# Patient Record
Sex: FEMALE | Race: WHITE
[De-identification: names, ages, dates, MRNs, and addresses within clinical notes are randomized per-mention and may not be internally consistent; named-entity substitution may affect disease eponyms.]

---

## 2017-06-02 NOTE — DIAGNOSTIC IMAGING REPORT
MRI OF THE BRAIN WITHOUT AND WITH IV CONTRAST



CLINICAL HISTORY: Gait apraxia.    



COMPARISON STUDY:  MRI of the brain October 27, 2016. 



TECHNIQUE:  Utilizing a 1.5 Estefany magnet and dedicated coil, multiplanar,

multiecho imaging of the brain was performed pre and postcontrast

administration.  IV administration of 7 mL of Gadavist contrast was uneventful.



FINDINGS: There are no areas of restricted diffusion. No acute intracranial

hemorrhage, midline shift or mass effect is present. Ventricular system is

normal for age. Basilar cisterns are patent. There are no extra-axial

collections. Flow-voids for the major intracranial vessels are present. No

intracranial mass or pathologic enhancement is present. Multiple white matter T2

hyperintense foci are noted. Minimal progression is noted since prior MRI. The

findings suggest small vessel disease. There is mild atrophy. Calvarial signal

is maintained. Orbits and sinuses are unremarkable.



IMPRESSION:  



1. No acute intracranial findings.



2. No intracranial masses or pathologic enhancement.



3. Mild atrophy and moderate small vessel disease.







Electronically signed by:  Andrea Newman M.D.

6/2/2017 11:46 AM



Dictated Date/Time:  6/2/2017 11:42 AM

## 2017-06-06 NOTE — CODING QUERY MEDICAL NECESSITY
SUPPORTING DIAGNOSIS NEEDED



Dr. Garcia,



A supporting diagnosis is required for the test/procedure performed on this patient in 
order for us to be reimbursed by the patient's insurance. Please provide a supporting 
diagnosis for the following test/procedure listed below next to the test name along with 
your signature. 



*If there is no additional diagnosis for this patient that would support the following 
test/procedure please document that below next to the test/procedure.



Test(s)/Procedure(s) that require a supporting diagnosis:





* ([id number]) B12 VITAMIN LEVEL       DIAGNOSIS:





***DATE OF SERVICE: 5/31/17***





Provider Signature:  ______________________________  Date:  _______



Thank you  

Rk Eugene

[facility] Information Management

Phone:  [phone]

Fax:  [phone]



Once completed, please kindly fax back to [phone]



For questions please call [phone]

## 2017-11-14 NOTE — MAMMOGRAPHY REPORT
BILATERAL DIGITAL SCREENING MAMMOGRAM WITH CAD: 11/13/2017

CLINICAL HISTORY: Routine screening.  Patient has no complaints.  





TECHNIQUE: Bilateral CC and MLO views were obtained.  Current study was also evaluated with a Compute
r Aided Detection (CAD) system.  



COMPARISON: Comparison is made to exams dated:  10/20/2016 mammogram, 10/19/2015 mammogram, 11/4/2014
 mammogram, 10/17/2014 mammogram, 10/4/2013 mammogram, and 10/2/2012 mammogram - [facility].   



BREAST COMPOSITION:  There are scattered areas of fibroglandular density in both breasts.  



FINDINGS:  The exam is suboptimal due to inability of the patient to adequately position for the exam
, despite assistance from a second mammography technologist.  Within this limitation, there are moder
ate vascular calcifications in the breasts, and a stable biopsy marker clip in the left upper outer q
uadrant posteriorly.  A few scattered benign-appearing microcalcifications.  Decreased prominence of 
a nodular asymmetry in the right breast along the posterior nipple line on the CC view.  No new suspi
cious mass, architectural distortion or cluster of microcalcifications is seen.  



IMPRESSION:  ACR BI-RADS CATEGORY 2: BENIGN

There is no mammographic evidence of malignancy, within the limitations of the exam. A 1 year screeni
ng mammogram is recommended.  The patient will receive written notification of the results.  





Approximately 10% of breast cancers are not detected with mammography. A negative mammographic report
 should not delay biopsy if a clinically suggestive mass is present.



Rose Andersen M.D.          

ay/:11/13/2017 16:48:50  



Imaging Technologist: Vicky MCCRACKEN(HORACIO)(ISAIAH)(BD), [facility]

letter sent: Normal 1/2  

BI-RADS Code: ACR BI-RADS Category 2: Benign

## 2018-05-11 ENCOUNTER — HOSPITAL ENCOUNTER (EMERGENCY)
Dept: HOSPITAL 45 - C.EDB | Age: 83
Discharge: HOME | End: 2018-05-11
Payer: COMMERCIAL

## 2018-05-11 VITALS — OXYGEN SATURATION: 95 % | DIASTOLIC BLOOD PRESSURE: 95 MMHG | HEART RATE: 62 BPM | SYSTOLIC BLOOD PRESSURE: 142 MMHG

## 2018-05-11 VITALS — TEMPERATURE: 97.88 F

## 2018-05-11 DIAGNOSIS — I10: ICD-10-CM

## 2018-05-11 DIAGNOSIS — E11.9: ICD-10-CM
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
## 2018-05-11 DIAGNOSIS — M25.461: Primary | ICD-10-CM
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
## 2018-05-11 NOTE — EMERGENCY ROOM VISIT NOTE
ED Visit Note


First contact with patient:  14:58


I have seen and examined this patient with Eugene Ansari and generally agree 

with the treatment plan as discussed.


Problem List


Medical Problems:


(1) Benign hypertension


Status: Chronic  





(2) Cholecystectomy


Status: Resolved  





(3) Diabetes mellitus


Status: Chronic  





(4) Hemorrhoid


Status: Resolved  











Current/Historical Medications


Scheduled


Amlodipine Besylate (Norvasc), 5 MG PO DAILY


Aspirin (Aspirin Ec), 81 MG PO Q2D


Cetirizine (Zyrtec), 10 MG PO DAILY


Esomeprazole Magnesium (Nexium), 40 MG PO DAILY


Metformin Hcl (Glucophage), 500 MG PO BID


Metoprolol Succinate (Metoprolol Succinate ER), 25 MG PO BID


Mometasone Furoate (Nasal) (Nasonex), 1 SPRAY GONZÁLEZ DAILY


Multiple Vitamins W/ Minerals (Centrum Silver), 1 TAB PO DAILY


Omega-3 Fatty Acids (Omega 3), 1 CAP PO QAM


Psyllium (Metamucil Powder), 1 PACK PO DAILY


Simvastatin (Zocor), 20 MG PO QPM


Valsartan (Diovan), 80 MG PO DAILY





Allergies


Coded Allergies:  


     Amoxicillin (Unverified  Allergy, Unknown, diarrhea, 11/24/16)


     Ciprofloxacin (Verified  Adverse Reaction, Unknown, nausea/vomiting, 11/24/ 16)


     Hydrocodone (Verified  Adverse Reaction, Unknown, n/v loss of appetite, 11/ 24/16)





Vital Signs











  Date Time  Temp Pulse Resp B/P (MAP) Pulse Ox O2 Delivery O2 Flow Rate FiO2


 


5/11/18 14:55 36.6 72 18 151/67 97 Room Air  











Departure Information


Referrals


Garrett Garcia M.D. (PCP)





Patient Instructions


My [facility]

## 2018-05-11 NOTE — EMERGENCY ROOM VISIT NOTE
History


First contact with patient:  14:58


Chief Complaint:  LEG PAIN,LEG INJURY


Stated Complaint:  FT LEG PAIN





History of Present Illness


The patient is a 82 year old female who presents to the Emergency Room with her 

 with complaints of right knee and leg pain.  The patient reports that 

she was trying to get into a pickup when her right foot slipped, and she fell, 

twisting her knee.  She denies hitting her head, and denies any other injuries 

to her upper extremities, neck or back.  She rates her discomfort a 5 out of 

10.  The patient denies any prior history of right knee surgeries.





Review of Systems


10 system review was performed and was negative except for pertinent positives 

and negatives as indicated in history of present illness





Past Medical/Surgical History


Medical Problems:


(1) Benign hypertension


(2) Cholecystectomy


(3) Diabetes mellitus


(4) Hemorrhoid








Family History





Gallbladder disease


Heart disease


Hypertension





Social History


Smoking Status:  Never Smoker


Alcohol Use:  none


Drug Use:  none


Marital Status:  in relationship


Housing Status:  lives with significant other


Occupation Status:  retired





Current/Historical Medications


Scheduled


Artificial Saliva (Mouthkote), 1 SPRAY MT Before Meals


Atorvastatin (Lipitor), 10 MG PO HS


Cholecalciferol (D3), 400 UNITS PO DAILY


Cyanocobalamin (Vitamin B-12), 1,000 MCG PO DAILY


Fluticasone Propionate (Nasal) (Flonase Allergy Relief), 1 SPRAY GONZÁLEZ QD@1700


Metoprolol Succinate (Metoprolol Succinate ER), 25 MG PO BID


Multiple Vitamins W/ Minerals (Theragran-M), 1 TAB PO QD@1700


Pantoprazole Sodium (Protonix), 40 MG PO QD@1700


Valsartan (Diovan), 80 MG PO DAILY


Valsartan (Diovan), 40 MG PO QD@1800





Physical Exam


Vital Signs











  Date Time  Temp Pulse Resp B/P (MAP) Pulse Ox O2 Delivery O2 Flow Rate FiO2


 


5/11/18 14:55 36.6 72 18 151/67 97 Room Air  











Physical Exam


CONSTITUTIONAL:  Healthy and well nourished.  Alert and oriented X 3 with 

positive affect.  Patient appears in mild discomfort.


HEENT:  Normocephalic, atraumatic.  Pupils equal, round and reactive.  No 

epistaxis, hemotympanum, subconjunctival hemorrhage, raccoon's eyes or kraus 

sign.


NECK:  Full active range of motion without discomfort.


RESPIRATORY:  Clear to auscultation bilaterally with no wheezing, crackles, 

rhonchi or stridor.


CARDIOVASCULAR:  Regular rate and rhythm with no murmurs, rubs or gallops.


GASTROINTESTINAL:  Bowel sounds present in all quadrants.  Soft and nontender 

to palpation.


MUSCULOSKELETAL: Examination shows generalized discomfort to palpation of the 

right knee and proximal lateral leg.  Patient has no focal tenderness over the 

medial joint line or peripatellar region.  Range of motion was deferred on 

initial exam because of patient discomfort.  Pedal pulses are intact.  The 

patient has no tenderness to palpation through the distal leg, ankle or foot 

region.


INTEGUMENTARY:  No rash or other significant dermatologic conditions noted.


NEUROLOGIC:  No focal neurologic deficits noted.  Right foot and toes are 

grossly sensory intact.





Medical Decision & Procedures


ER Provider


Diagnostic Interpretation:


My interpretation of right knee and tib-fib x-rays shows a moderate knee joint 

effusion, otherwise no other acute fractures or dislocation noted.  Radiologist 

report is as follows:





R TIBIA/FIBULA 2 VIEWS ROUTINE, R KNEE 3 VIEWS





HISTORY:  82 years-old Female R knee/leg pain acute right knee, tibia and fibula


pain





COMPARISON: None available





TECHNIQUE: 3 views of the right knee with 2 views of the right tibia and fibula





FINDINGS: 





KNEE:


The bones appear mildly demineralized. Tricompartmental osteoarthritis, moderate


within the medial compartment, mild to moderate patellofemoral and mild lateral


compartment disease. Small-to-moderate knee joint effusion with mild soft tissue


swelling about the knee. No acute fracture or dislocation is identified.


Peripheral arterial calcifications are noted.





TIBIA/FIBULA:


No acute fracture or dislocation. Degenerative changes about the ankle. Moderate


soft tissue swelling about the mid and lower leg and ankle. Limited study


secondary to oblique positioning on the lateral view.





IMPRESSION: 


1. Soft tissue swelling without acute fracture or dislocation. 


2. Small-to-moderate knee joint effusion.





ED Course


Patient history and physical exam were performed.  Nurse's notes were reviewed.

  Vital signs were reviewed and were normal.  The patient refused any 

analgesics on initial exam.  X-rays of the right knee and leg shows a moderate 

knee effusion, otherwise no other acute fractures or dislocations noted.  X-ray 

findings were reviewed with the patient.  A knee immobilizer was applied.  The 

patient was encouraged to intermittently apply ice and elevate the leg/knee for 

swelling and pain.  Ibuprofen or Tylenol as needed for pain.  The patient was 

encouraged to follow-up with orthopedics for further reevaluation and 

management.  The patient was happy with plan of care, voiced understanding of 

all discharge instructions, and rated her discomfort a 3 out of 10 at the 

conclusion of my exam.





The patient was also evaluated by Dr. Castillo, ED attending physician, who 

agrees with workup and plan of care.





Medical Decision








Medication Reconcilliation


Current Medication List:  was personally reviewed by me





Blood Pressure Screening


Patient's blood pressure:  Normal blood pressure





Impression





 Primary Impression:  


 Right knee injury


 Additional Impression:  


 Fall from slip, trip, or stumble





Departure Information


Referrals


Garrett Garcia M.D. (PCP)





Patient Instructions


My [facility]





Problem Qualifiers








 Primary Impression:  


 Right knee injury


 Encounter type:  initial encounter  Qualified Codes:  S89.91XA - Unspecified 

injury of right lower leg, initial encounter


 Additional Impression:  


 Fall from slip, trip, or stumble


 Encounter type:  initial encounter  Qualified Codes:  W01.0XXA - Fall on same 

level from slipping, tripping and stumbling without subsequent striking against 

object, initial encounter

## 2018-05-11 NOTE — DIAGNOSTIC IMAGING REPORT
R TIBIA/FIBULA 2 VIEWS ROUTINE, R KNEE 3 VIEWS



HISTORY:  82 years-old Female R knee/leg pain acute right knee, tibia and fibula

pain



COMPARISON: None available



TECHNIQUE: 3 views of the right knee with 2 views of the right tibia and fibula



FINDINGS: 



KNEE:

The bones appear mildly demineralized. Tricompartmental osteoarthritis, moderate

within the medial compartment, mild to moderate patellofemoral and mild lateral

compartment disease. Small-to-moderate knee joint effusion with mild soft tissue

swelling about the knee. No acute fracture or dislocation is identified.

Peripheral arterial calcifications are noted.



TIBIA/FIBULA:

No acute fracture or dislocation. Degenerative changes about the ankle. Moderate

soft tissue swelling about the mid and lower leg and ankle. Limited study

secondary to oblique positioning on the lateral view.



IMPRESSION: 

1. Soft tissue swelling without acute fracture or dislocation. 

2. Small-to-moderate knee joint effusion.







The above report was generated using voice recognition software. It may contain

grammatical, syntax or spelling errors.







Electronically signed by:  Gopi Juarez M.D.

5/11/2018 3:37 PM



Dictated Date/Time:  5/11/2018 3:31 PM

## 2018-05-25 ENCOUNTER — HOSPITAL ENCOUNTER (OUTPATIENT)
Dept: HOSPITAL 45 - C.GI | Age: 83
Discharge: HOME | End: 2018-05-25
Attending: INTERNAL MEDICINE
Payer: COMMERCIAL

## 2018-05-25 VITALS
BODY MASS INDEX: 22.02 KG/M2 | BODY MASS INDEX: 22.02 KG/M2 | HEIGHT: 67.99 IN | WEIGHT: 145.31 LBS | WEIGHT: 145.31 LBS | HEIGHT: 67.99 IN | BODY MASS INDEX: 22.02 KG/M2

## 2018-05-25 VITALS — OXYGEN SATURATION: 98 % | SYSTOLIC BLOOD PRESSURE: 171 MMHG | DIASTOLIC BLOOD PRESSURE: 73 MMHG | HEART RATE: 18 BPM

## 2018-05-25 DIAGNOSIS — Z88.1: ICD-10-CM

## 2018-05-25 DIAGNOSIS — Z88.5: ICD-10-CM

## 2018-05-25 DIAGNOSIS — Z85.828: ICD-10-CM

## 2018-05-25 DIAGNOSIS — Z88.0: ICD-10-CM

## 2018-05-25 DIAGNOSIS — Z90.49: ICD-10-CM

## 2018-05-25 DIAGNOSIS — K21.9: ICD-10-CM

## 2018-05-25 DIAGNOSIS — K22.2: ICD-10-CM

## 2018-05-25 DIAGNOSIS — E78.00: ICD-10-CM

## 2018-05-25 DIAGNOSIS — I10: ICD-10-CM

## 2018-05-25 DIAGNOSIS — E11.9: ICD-10-CM

## 2018-05-25 DIAGNOSIS — R13.10: Primary | ICD-10-CM

## 2018-05-25 DIAGNOSIS — M19.90: ICD-10-CM

## 2018-05-25 DIAGNOSIS — K44.9: ICD-10-CM

## 2018-05-25 NOTE — ANESTHESIOLOGY PROGRESS NOTE
Anesthesia Post Op Note


Date & Time


May 25, 2018 at 10:31





Vital Signs


Pain Intensity:  0





Vital Signs Past 12 Hours








  Date Time  Temp Pulse Resp B/P (MAP) Pulse Ox O2 Delivery O2 Flow Rate FiO2


 


5/25/18 10:06  18 55 171/73 (105) 98 Room Air  


 


5/25/18 09:51  16 60 146/78 (100) 98 Room Air  


 


5/25/18 09:36  16 52 126/60 (82) 98 Room Air  


 


5/25/18 08:24 36.5 56 18 153/68 (96) 98 Room Air  











Notes


Mental Status:  alert / awake / arousable, participated in evaluation


Pt Amnestic to Procedure:  Yes


Nausea / Vomiting:  adequately controlled


Pain:  adequately controlled


Airway Patency, RR, SpO2:  stable & adequate


BP & HR:  stable & adequate


Hydration State:  stable & adequate


Anesthetic Complications:  no major complications apparent

## 2018-05-25 NOTE — DISCHARGE INSTRUCTIONS
Endoscopy Patient Instructions


Date / Procedure(s) Performed


May 25, 2018.


EGD





Allergy Information


Coded Allergies:  


     Atenolol (Verified  Allergy, Mild, UNKNOWN, 5/21/18)


     Atorvastatin (Verified  Allergy, Mild, GI SYMPTOMS, 5/21/18)


     Chlorthalidone (Verified  Allergy, Mild, UNKNOWN, 5/21/18)


     Hydrochlorothiazide (Verified  Allergy, Mild, GI SYMPTOMS, 5/21/18)


     Naproxen (Verified  Allergy, Mild, GI SYMPTOMS, 5/21/18)


     Amoxicillin (Verified  Allergy, Unknown, diarrhea, 5/25/18)


     Ciprofloxacin (Verified  Adverse Reaction, Unknown, nausea/vomiting, 5/21/ 18)


     Hydrocodone (Verified  Adverse Reaction, Unknown, n/v loss of appetite, 5/ 21/18)





Discharge Date / Findings


May 25, 2018.


Schatzki's Ring s/p dilation


Hiatal hernia





Medication Instructions


OK to resume all medications today as prescribed





Reported Home Medications








 Medications  Dose


 Route/Sig


 Max Daily Dose Days Date Category


 


 Meloxicam 15 Mg


 Tab  1 Tab


 PO QAM


    5/17/18 Reported


 


 Sinemet


 25MG/100MG


  (Carbidopa/Levodopa)


 1 Ea Tab  0.5 Tab


 PO TID


    5/17/18 Reported


 


 Diovan


  (Valsartan) 40 Mg


 Tab  40 Mg


 PO QD@1800


    5/11/18 Reported


 


 Theragran-M


  (Multiple


 Vitamins W/


 Minerals) 1 Tab


 Tab  1 Tab


 PO QD@1700


    5/11/18 Reported


 


 Protonix


  (Pantoprazole


 Sodium) 40 Mg Tab  40 Mg


 PO QD@1700


    5/11/18 Reported


 


 Flonase Allergy


 Relief


  (Fluticasone


 Propionate


  (Nasal)) 50


 Mcg/Act Spr  1 Spray


 GONZÁLEZ QD@1700


    5/11/18 Reported


 


 Vitamin B-12


  (Cyanocobalamin)


 1,000 Mcg Tab  1,000 Mcg


 PO DAILY


    5/11/18 Reported


 


 D3


  (Cholecalciferol)


 400 Unit Cap  400 Units


 PO DAILY


    5/11/18 Reported


 


 Lipitor


  (Atorvastatin


 Calcium) 10 Mg Tab  10 Mg


 PO HS


    5/11/18 Reported


 


 Diovan


  (Valsartan) 80 Mg


 Tab  80 Mg


 PO DAILY


    7/3/16 Reported


 


 Metoprolol


 Succinate ER


  (Metoprolol


 Succinate) 25 Mg


 Tabcr  25 Mg


 PO BID


    7/3/16 Reported











Provider Instructions





Activity Restrictions





-  No exercising or heavy lifting for 24 hours. 


-  Do not drink alcohol the day of the procedure.


-  Do not drive a car or operate machinery until the day after the procedure.


-  Do not make any important decisions or sign important papers in 24 hours 

after the procedure.





Following Day:





-  Return to full activity which may include returning to work/school.





Diet





Start your diet with liquids and light foods (jello, soup, juice, toast).  Then 

eat your usual diet if not nauseated.





Treatment For Common After Affects





For mild abdominal pain, bloating, or excessive gas:





-  Rest


-  Eat lightly


-  Lie on right side





Follow-Up Information


Follow-up with VIVEK FERNÁNDEZ as scheduled





Anesthesia Information





What You Should Know





You have had a procedure that required some medicine to reduce anxiety and 

discomfort. This treatment is called moderate sedation.  


After receiving the treatment, you may be sleepy, but you will be able to 

breathe on your own.  The effects of the treatment may last for several hours.








Follow these instructions along with Activity/Diet recommendations noted above:





*  Do NOT do anything where dizziness or clumsiness would be dangerous.





*  Rest quietly at home today, then you can be up and about tomorrow.





*  Have a responsible person stay with you the rest of today.





*  You may have had an I.V. today.  If so, you may take the dressing off later 

today.





Recommendations


 


Call your doctor if:





*  Trouble breathing 





*  Continuous vomiting for more than 24 hours








*  Temperature above 101 degrees





*  Severe abdominal pain or bloating





*  Pain not relieved by pain medicine ordered





*  There is increased drainage or redness from any incision





*  A large amount of rectal bleeding greater than 2-3 tablespoons. 


   (If you had a polyp/s removed or have hemorrhoids, a small amount of blood -


    from the rectum is to be expected.)





*  You have any unanswered questions or concerns.








IN THE EVENT OF A SERIOUS EMERGENCY, GO TO THE NEAREST EMERGENCY ROOM








       Your discharge instructions were prepared by provider Jose A Espinoza.





 Patient Instructions Signature Page














Shruthi Ardon 











Patient (or Guardian) Signature/Date:____________________________________ I 

have read and understand the instructions given to me by my caregivers.








Caregiver/RN/Doctor Signature/Date:____________________________________











The above-named patient and/or guardian has received patient instructions on 

this date.





























+  Original Patient Signature Page (only) stays with chart.  Please make copy 

for patient.

## 2018-05-25 NOTE — GI REPORT
Patient Name: Shruthi Ardon

Procedure Date: 5/25/2018 9:14 AM

MRN: [id number]

Account Number: [id number]

YOB: 1935

Admit Type: Outpatient

Age: 82

Gender: Female

Attending MD: Jose A Espinoza DO

Procedure:            Upper GI endoscopy

Providers:            Jose A Espinoza DO

Referring MD:         Garrett Garcia

Indications:          Dysphagia

Medicines:            Monitored Anesthesia Care

Complications:        No immediate complications.

Estimated Blood Loss: Estimated blood loss: none.

Procedure:            Pre-Anesthesia Assessment:

                      - Prior to the procedure, a History and Physical was 

                      performed, and patient medications and allergies were 

                      reviewed. The patient's tolerance of previous 

                      anesthesia was also reviewed. The risks and benefits of 

                      the procedure and the sedation options and risks were 

                      discussed with the patient. All questions were 

                      answered, and informed consent was obtained. Prior 

                      Anticoagulants: The patient has taken no previous 

                      anticoagulant or antiplatelet agents. ASA Grade 

                      Assessment: III - A patient with severe systemic 

                      disease. After reviewing the risks and benefits, the 

                      patient was deemed in satisfactory condition to undergo 

                      the procedure.

                      After obtaining informed consent, the endoscope was 

                      passed under direct vision. Throughout the procedure, 

                      the patient's blood pressure, pulse, and oxygen 

                      saturations were monitored continuously. The scope was 

                      introduced through the mouth, and advanced to the 

                      second part of duodenum. The upper GI endoscopy was 

                      accomplished without difficulty. The patient tolerated 

                      the procedure well.

Findings:

     A moderate Schatzki ring (acquired) was found at the gastroesophageal 

     junction. A TTS dilator was passed through the scope. Dilation with a 

     15-16.5-18 mm balloon and an 18-19-20 mm balloon dilator was performed 

     to 20 mm.

     A small hiatal hernia was present.

     The examined duodenum was normal.

Impression:           - Moderate Schatzki ring. Dilated.

                      - Small hiatal hernia.

                      - Normal examined duodenum.

                      - No specimens collected.

Recommendation:       - Resume previous diet.

                      - Continue present medications.

                      - Repeat upper endoscopy PRN for retreatment.

                      - Return to primary care physician as previously 

                      scheduled.

Jose A Espinoza DO

5/25/2018 9:50:28 AM

This report has been signed electronically.

Note Initiated On: 5/25/2018 9:14 AM

Number of Addenda: 0

     I attest to the content of the Intraoperative Record and orders 

     documented therein, exceptions below



{[id number]}

## 2018-05-25 NOTE — ENDO HISTORY AND PHYSICAL
History & Physical


Date of Service:


May 25, 2018.


Chief Complaint:


DYSPHAGIA


Referring Physician:


VIVEK FERNÁNDEZ


History of Present Illness


83 yo CF who presents for EGD secondary to dysphagia.





Past Medical History


Diabetes, Arthritis, Reflux, Cancer, High Cholesterol, Hypertension





Past Surgical History


Hx Cardiac Surgery:  No


Hx Internal Defibrillator:  No


Hx Pacemaker:  No


Hx Abdominal Surgery:  Yes (CHOLECYSTECTOMY (2004))


Hx of Implantable Prosthesis:  No


Hx Post-Op Nausea and Vomiting:  No


Hx Cancer Surgery:  Yes (SKIN CANCER REMOVED OFF LEG)


Hx Thoracic Surgery:  No


Hx Orthopedic:  Yes (RIGHT KNEE ARTHROSCOPY)


Hx Urinary Tract Surgery:  No





Family History


None





Social History


Smoking Status:  Never Smoker


Hx Substance Use:  No


Hx Alcohol Use:  No





Allergies


Coded Allergies:  


     Atenolol (Verified  Allergy, Mild, UNKNOWN, 5/21/18)


     Atorvastatin (Verified  Allergy, Mild, GI SYMPTOMS, 5/21/18)


     Chlorthalidone (Verified  Allergy, Mild, UNKNOWN, 5/21/18)


     Hydrochlorothiazide (Verified  Allergy, Mild, GI SYMPTOMS, 5/21/18)


     Naproxen (Verified  Allergy, Mild, GI SYMPTOMS, 5/21/18)


     Amoxicillin (Verified  Allergy, Unknown, diarrhea, 5/25/18)


     Ciprofloxacin (Verified  Adverse Reaction, Unknown, nausea/vomiting, 5/21/ 18)


     Hydrocodone (Verified  Adverse Reaction, Unknown, n/v loss of appetite, 5/ 21/18)





Current Medications





Reported Home Medications








 Medications  Dose


 Route/Sig


 Max Daily Dose Days Date Category


 


 Meloxicam 15 Mg


 Tab  1 Tab


 PO QAM


    5/17/18 Reported


 


 Sinemet


 25MG/100MG


  (Carbidopa/Levodopa)


 1 Ea Tab  0.5 Tab


 PO TID


    5/17/18 Reported


 


 Diovan


  (Valsartan) 40 Mg


 Tab  40 Mg


 PO QD@1800


    5/11/18 Reported


 


 Theragran-M


  (Multiple


 Vitamins W/


 Minerals) 1 Tab


 Tab  1 Tab


 PO QD@1700


    5/11/18 Reported


 


 Protonix


  (Pantoprazole


 Sodium) 40 Mg Tab  40 Mg


 PO QD@1700


    5/11/18 Reported


 


 Flonase Allergy


 Relief


  (Fluticasone


 Propionate


  (Nasal)) 50


 Mcg/Act Spr  1 Spray


 GONZÁLEZ QD@1700


    5/11/18 Reported


 


 Vitamin B-12


  (Cyanocobalamin)


 1,000 Mcg Tab  1,000 Mcg


 PO DAILY


    5/11/18 Reported


 


 D3


  (Cholecalciferol)


 400 Unit Cap  400 Units


 PO DAILY


    5/11/18 Reported


 


 Lipitor


  (Atorvastatin


 Calcium) 10 Mg Tab  10 Mg


 PO HS


    5/11/18 Reported


 


 Diovan


  (Valsartan) 80 Mg


 Tab  80 Mg


 PO DAILY


    7/3/16 Reported


 


 Metoprolol


 Succinate ER


  (Metoprolol


 Succinate) 25 Mg


 Tabcr  25 Mg


 PO BID


    7/3/16 Reported











Vital Signs


Weight (Kilograms):  65.91


Height (Feet):  5


Height (Inches):  8











  Date Time  Temp Pulse Resp B/P (MAP) Pulse Ox O2 Delivery O2 Flow Rate FiO2


 


5/25/18 08:24 36.5 56 18 153/68 (96) 98 Room Air  











Physical Exam


General Appearance:  WD/WN, no apparent distress


Respiratory/Chest:  


   Auscultation:  breath sounds normal


Cardiovascular:  


   Heart Auscultation:  RRR


Abdomen:  


   Bowel Sounds:  normal


   Inspection & Palpation:  soft, non-distended, no tenderness, guarding & 

rebound





Assessment and Plan


Assessment:


83 yo CF who presents for EGD secondary to dysphagia.








Plan:


Proceed with EGD.

## 2018-07-31 ENCOUNTER — HOSPITAL ENCOUNTER (OUTPATIENT)
Dept: HOSPITAL 45 - C.ULTRBC | Age: 83
Discharge: HOME | End: 2018-07-31
Attending: FAMILY MEDICINE
Payer: COMMERCIAL

## 2018-07-31 DIAGNOSIS — N13.30: Primary | ICD-10-CM

## 2018-07-31 NOTE — DIAGNOSTIC IMAGING REPORT
EXAMINATION: RENAL ULTRASOUND



CLINICAL HISTORY: RT HYDRONEPHROSIS    



COMPARISON STUDY:  CT scan dated 7/21/2018



FINDINGS: The right kidney measures 9.5 cm. The left kidney measures 9.7 cm. 

There is no evidence of hydronephrosis.  There are no renal masses.



No bladder abnormalities are visualized.  Bilateral ureteral jets were

visualized.

                 

IMPRESSION : 

1. No renal masses identified

2. Slight prominence of the right renal pelvis but no evidence of significant

hydronephrosis. Ureteral obstruction is unlikely to be present as bilateral

ureteral jets were visualized.







Electronically signed by:  James Hernandez M.D.

7/31/2018 10:22 AM



Dictated Date/Time:  7/31/2018 10:19 AM

## 2018-08-07 ENCOUNTER — HOSPITAL ENCOUNTER (OUTPATIENT)
Dept: HOSPITAL 45 - C.LABBC | Age: 83
Discharge: HOME | End: 2018-08-07
Attending: OBSTETRICS & GYNECOLOGY
Payer: COMMERCIAL

## 2018-08-07 DIAGNOSIS — N83.209: Primary | ICD-10-CM
